# Patient Record
Sex: OTHER/UNKNOWN | URBAN - METROPOLITAN AREA
[De-identification: names, ages, dates, MRNs, and addresses within clinical notes are randomized per-mention and may not be internally consistent; named-entity substitution may affect disease eponyms.]

---

## 2020-09-18 ENCOUNTER — APPOINTMENT (OUTPATIENT)
Age: 70
Setting detail: DERMATOLOGY
End: 2020-09-28

## 2020-09-18 VITALS
HEIGHT: 63 IN | DIASTOLIC BLOOD PRESSURE: 75 MMHG | HEART RATE: 51 BPM | SYSTOLIC BLOOD PRESSURE: 152 MMHG | WEIGHT: 145 LBS

## 2020-09-18 DIAGNOSIS — D18.0 HEMANGIOMA: ICD-10-CM

## 2020-09-18 DIAGNOSIS — L81.4 OTHER MELANIN HYPERPIGMENTATION: ICD-10-CM

## 2020-09-18 DIAGNOSIS — L82.0 INFLAMED SEBORRHEIC KERATOSIS: ICD-10-CM

## 2020-09-18 PROBLEM — D18.01 HEMANGIOMA OF SKIN AND SUBCUTANEOUS TISSUE: Status: ACTIVE | Noted: 2020-09-18

## 2020-09-18 PROCEDURE — 17110 DESTRUCT B9 LESION 1-14: CPT

## 2020-09-18 PROCEDURE — OTHER LIQUID NITROGEN: OTHER

## 2020-09-18 PROCEDURE — 99203 OFFICE O/P NEW LOW 30 MIN: CPT | Mod: 25

## 2020-09-18 PROCEDURE — OTHER TREATMENT REGIMEN: OTHER

## 2020-09-18 PROCEDURE — OTHER MIPS QUALITY: OTHER

## 2020-09-18 PROCEDURE — OTHER COUNSELING: OTHER

## 2020-09-18 ASSESSMENT — LOCATION DETAILED DESCRIPTION DERM
LOCATION DETAILED: RIGHT INFERIOR MEDIAL MALAR CHEEK
LOCATION DETAILED: LEFT LATERAL ABDOMEN
LOCATION DETAILED: RIGHT DISTAL RADIAL DORSAL FOREARM
LOCATION DETAILED: RIGHT PROXIMAL PRETIBIAL REGION

## 2020-09-18 ASSESSMENT — LOCATION ZONE DERM
LOCATION ZONE: ARM
LOCATION ZONE: FACE
LOCATION ZONE: TRUNK
LOCATION ZONE: LEG

## 2020-09-18 ASSESSMENT — LOCATION SIMPLE DESCRIPTION DERM
LOCATION SIMPLE: RIGHT FOREARM
LOCATION SIMPLE: RIGHT CHEEK
LOCATION SIMPLE: RIGHT PRETIBIAL REGION
LOCATION SIMPLE: ABDOMEN

## 2020-09-18 NOTE — PROCEDURE: TREATMENT REGIMEN
Plan: In no Improvement in 6 weeks will consider a biopsy
Detail Level: Zone
Otc Regimen: l'oreal age removal
Samples Given: Aquaphor ointment once a day for a week

## 2020-11-06 ENCOUNTER — APPOINTMENT (OUTPATIENT)
Age: 70
Setting detail: DERMATOLOGY
End: 2020-11-12

## 2020-11-06 VITALS
HEART RATE: 65 BPM | HEIGHT: 63 IN | SYSTOLIC BLOOD PRESSURE: 151 MMHG | WEIGHT: 138 LBS | DIASTOLIC BLOOD PRESSURE: 82 MMHG

## 2020-11-06 DIAGNOSIS — L82.1 OTHER SEBORRHEIC KERATOSIS: ICD-10-CM

## 2020-11-06 DIAGNOSIS — L82.0 INFLAMED SEBORRHEIC KERATOSIS: ICD-10-CM

## 2020-11-06 DIAGNOSIS — D69.2 OTHER NONTHROMBOCYTOPENIC PURPURA: ICD-10-CM

## 2020-11-06 PROCEDURE — OTHER MIPS QUALITY: OTHER

## 2020-11-06 PROCEDURE — 99213 OFFICE O/P EST LOW 20 MIN: CPT

## 2020-11-06 PROCEDURE — OTHER COUNSELING: OTHER

## 2020-11-06 PROCEDURE — OTHER REASSURANCE: OTHER

## 2020-11-06 ASSESSMENT — LOCATION DETAILED DESCRIPTION DERM
LOCATION DETAILED: RIGHT DISTAL PRETIBIAL REGION
LOCATION DETAILED: LEFT DORSAL 2ND TOE
LOCATION DETAILED: RIGHT PROXIMAL PRETIBIAL REGION

## 2020-11-06 ASSESSMENT — LOCATION ZONE DERM
LOCATION ZONE: TOE
LOCATION ZONE: LEG

## 2020-11-06 ASSESSMENT — LOCATION SIMPLE DESCRIPTION DERM
LOCATION SIMPLE: RIGHT PRETIBIAL REGION
LOCATION SIMPLE: LEFT 2ND TOE